# Patient Record
Sex: FEMALE | Race: BLACK OR AFRICAN AMERICAN | Employment: FULL TIME | URBAN - METROPOLITAN AREA
[De-identification: names, ages, dates, MRNs, and addresses within clinical notes are randomized per-mention and may not be internally consistent; named-entity substitution may affect disease eponyms.]

---

## 2017-02-23 ENCOUNTER — HOSPITAL ENCOUNTER (OUTPATIENT)
Dept: SURGERY | Age: 37
Discharge: HOME | End: 2017-02-23
Attending: OBSTETRICS & GYNECOLOGY
Payer: COMMERCIAL

## 2017-02-23 VITALS
BODY MASS INDEX: 34.16 KG/M2 | HEIGHT: 63 IN | DIASTOLIC BLOOD PRESSURE: 110 MMHG | TEMPERATURE: 98.2 F | WEIGHT: 192.8 LBS | HEART RATE: 95 BPM | SYSTOLIC BLOOD PRESSURE: 173 MMHG | OXYGEN SATURATION: 97 %

## 2017-02-23 LAB — HGB BLD-MCNC: 12.5 G/DL (ref 11.7–15.4)

## 2017-02-23 PROCEDURE — 85018 HEMOGLOBIN: CPT | Performed by: ANESTHESIOLOGY

## 2017-02-23 RX ORDER — FLUTICASONE PROPIONATE 50 MCG
1 SPRAY, SUSPENSION (ML) NASAL 2 TIMES DAILY
COMMUNITY

## 2017-02-23 RX ORDER — ASCORBIC ACID 500 MG
TABLET ORAL
COMMUNITY
End: 2018-06-07

## 2017-02-23 NOTE — PERIOP NOTES
Patient verified name, , and surgery as listed in [facility]. Type 2 surgery, walk in assessment complete. Labs per surgeon: unknown; no orders received. Labs per anesthesia protocol: HGB; results pending. T&S ordered for dos. EKG: not needed per [facility] protocols. Dr. Manish Cervantes with anesthesia called due to elevated BP during assessment today. 157/109 & 173/110. Patient anxious and tearful during assessment today due to the recent passing of her sister. Dr. Manish Cervantes did not request to see patient today and no orders received. Stated \" I'm okay with that \". Hibiclens and instructions given per hospital policy. Patient provided with handouts including Guide to Surgery, Pain Management, Hand Hygiene, Blood Transfusion Education, and [place] [facility] Brochure. Patient answered medical/surgical history questions at their best of ability. All prior to admission medications documented in [facility]. Original medication prescription bottles not visualized during patient appointment. Patient instructed to hold all vitamins 7 days prior to surgery and NSAIDS 5 days prior to surgery, patient verbalized understanding. Medications to be held: vitamins. Patient instructed to continue previous medications as prescribed prior to surgery and to take the following medications the day of surgery according to anesthesia guidelines with a small sip of water: flonase, tramadol if needed. Patient taught back and verbalized understanding.

## 2017-02-24 ENCOUNTER — ANESTHESIA EVENT (OUTPATIENT)
Dept: SURGERY | Age: 37
DRG: 743 | End: 2017-02-24
Payer: COMMERCIAL

## 2017-02-24 NOTE — INTERVAL H&P NOTE
H&P Update:  Torie Anguiano was seen and examined. History and physical has been reviewed. The patient has been examined.  There have been no significant clinical changes since the completion of the originally dated History and Physical.    Signed By: Didier Mallory MD     February 24, 2017 9:21 AM

## 2017-02-24 NOTE — H&P
ALISON Foreman is a 43 y.o. female seen today to discuss hysterectomy. She had myomectomy by Dr. Jayashree Becker in the past and has been seeing Dr. Martha Whitman at [facility] trying for pregnancy. She had a recent MRI which showed the uterus to be enlarged to 20 week size with multiple uterine fibroids. Dr. Martha Whitman gave her the options of myomectomy again or proceeding with hysterectomy. She is leaning toward hysterectomy but has not made a final decision yet. She is to meet with Dr. Martha Whitman next week.     Past Medical History, Past Surgical History, Family history, Social History, and Medications were all reviewed with the patient today and updated as necessary.           Current Outpatient Prescriptions   Medication Sig    traMADol (ULTRAM) 50 mg tablet Take 1 Tab by mouth every six (6) hours as needed for Pain. Max Daily Amount: 200 mg.    zolpidem (AMBIEN) 10 mg tablet Take 1 Tab by mouth nightly as needed. Max Daily Amount: 10 mg.    norethindrone-ethinyl estradiol (ORTHO-NOVUM 1/35, 28,) 1-35 mg-mcg per tablet Take 1 Tab by mouth every evening.  (Patient taking differently: Take 2 Tabs by mouth every evening.)      No current facility-administered medications for this visit.       No Known Allergies       Past Medical History   Diagnosis Date    Abnormal Pap smear of cervix      Fibroids               Past Surgical History   Procedure Laterality Date    Hx colposcopy        Hx myomectomy        Hx cyst removal Left         under arm            Family History   Problem Relation Age of Onset    Hypertension Mother      Elevated Lipids Mother      Hypertension Father      Diabetes Father      Heart Disease Father      Elevated Lipids Father      High Cholesterol Father      Breast Cancer Other                Social History    Substance Use Topics     Smoking status: Never Smoker     Smokeless tobacco: Not on file     Alcohol use 0.0 oz/week         Comment: occassionally               History   Sexual Activity    Sexual activity: Yes    Partners: Male    Birth control/ protection: Pill, Condom      Obstetric History    T0  TAB0 SAB0 E0 M0 L0              Health Maintenance  Mammogram:   Colonoscopy:   Bone Density:     ROS:     Review of Systems  General: Not Present- Appetite Loss, Chills, Excessive Crying, Fatigue, Fever, Night Sweats and Tiredness. Female Genitourinary: Not Present- Dysmenorrhea, Dyspareunia, Pelvic Pain, Urinary Complaints, Vaginal Discharge, Vaginal dryness and Vaginal itching/burning. + for menstral irregularities and excessive bleeding                PHYSICAL EXAM:          Visit Vitals    /70    Ht 5' 3\" (1.6 m)    Wt 194 lb (88 kg)    BMI 34.37 kg/m2      Constitutional: Vital signs are normal. She appears well-developed and well-nourished. She is active and cooperative. Neurological: She is alert. Nursing note and vitals reviewed.           Medical problems and test results were reviewed with the patient today.      ASSESSMENT and 210 Gilma Briceño was seen today for advice only.     Diagnoses and all orders for this visit:     Abnormal uterine bleeding     Uterine leiomyoma, unspecified location     Other orders  - traMADol (ULTRAM) 50 mg tablet; Take 1 Tab by mouth every six (6) hours as needed for Pain. Max Daily Amount: 200 mg.  - zolpidem (AMBIEN) 10 mg tablet; Take 1 Tab by mouth nightly as needed. Max Daily Amount: 10 mg.     Discussed [place] with patient in detail. Gave her reading info regarding hysterectomy.  She will call us back if she wishes to proceed with hysterectomy     Follow-up Disposition:  Return if symptoms worsen or fail to improve.        Corrinne Puma, MD            Electronically signed by Corrinne Puma, MD at 17 1106        Office Visit on 2017              Revision History              Detailed Report             Note shared with patient   Note Details   Author Corrinne Puma, MD File Time 17 110   Author Type Physician Status Signed   Last  Suni Witt MD Specialty Obstetrics & Gynecology

## 2017-02-27 ENCOUNTER — ANESTHESIA (OUTPATIENT)
Dept: SURGERY | Age: 37
DRG: 743 | End: 2017-02-27
Payer: COMMERCIAL

## 2017-02-27 ENCOUNTER — HOSPITAL ENCOUNTER (INPATIENT)
Age: 37
LOS: 2 days | Discharge: HOME | DRG: 743 | End: 2017-03-01
Attending: OBSTETRICS & GYNECOLOGY | Admitting: OBSTETRICS & GYNECOLOGY
Payer: COMMERCIAL

## 2017-02-27 DIAGNOSIS — D25.1 INTRAMURAL LEIOMYOMA OF UTERUS: ICD-10-CM

## 2017-02-27 DIAGNOSIS — R03.0 TRANSIENT ELEVATED BLOOD PRESSURE: Primary | ICD-10-CM

## 2017-02-27 PROBLEM — D21.9 FIBROID: Status: ACTIVE | Noted: 2017-02-27

## 2017-02-27 LAB
ABO + RH BLD: NORMAL
BLOOD GROUP ANTIBODIES SERPL: NORMAL
CREAT SERPL-MCNC: 0.69 MG/DL (ref 0.6–1)
HCG UR QL: NEGATIVE
SPECIMEN EXP DATE BLD: NORMAL

## 2017-02-27 PROCEDURE — 86900 BLOOD TYPING SEROLOGIC ABO: CPT | Performed by: ANESTHESIOLOGY

## 2017-02-27 PROCEDURE — 77030011266 HC ELECTRD BLD INSL COVD -A: Performed by: OBSTETRICS & GYNECOLOGY

## 2017-02-27 PROCEDURE — 74011250636 HC RX REV CODE- 250/636: Performed by: ANESTHESIOLOGY

## 2017-02-27 PROCEDURE — 74011250637 HC RX REV CODE- 250/637: Performed by: OBSTETRICS & GYNECOLOGY

## 2017-02-27 PROCEDURE — 74011250636 HC RX REV CODE- 250/636: Performed by: OBSTETRICS & GYNECOLOGY

## 2017-02-27 PROCEDURE — 81025 URINE PREGNANCY TEST: CPT

## 2017-02-27 PROCEDURE — 77030031139 HC SUT VCRL2 J&J -A: Performed by: OBSTETRICS & GYNECOLOGY

## 2017-02-27 PROCEDURE — 76210000006 HC OR PH I REC 0.5 TO 1 HR: Performed by: OBSTETRICS & GYNECOLOGY

## 2017-02-27 PROCEDURE — 77030016154: Performed by: OBSTETRICS & GYNECOLOGY

## 2017-02-27 PROCEDURE — 88307 TISSUE EXAM BY PATHOLOGIST: CPT | Performed by: OBSTETRICS & GYNECOLOGY

## 2017-02-27 PROCEDURE — 77030008477 HC STYL SATN SLP COVD -A: Performed by: NURSE ANESTHETIST, CERTIFIED REGISTERED

## 2017-02-27 PROCEDURE — 77030003029 HC SUT VCRL J&J -B: Performed by: OBSTETRICS & GYNECOLOGY

## 2017-02-27 PROCEDURE — 77030011278 HC ELECTRD LIG IMPT COVD -F: Performed by: OBSTETRICS & GYNECOLOGY

## 2017-02-27 PROCEDURE — 77030019908 HC STETH ESOPH SIMS -A: Performed by: NURSE ANESTHETIST, CERTIFIED REGISTERED

## 2017-02-27 PROCEDURE — 0UT90ZZ RESECTION OF UTERUS, OPEN APPROACH: ICD-10-PCS | Performed by: OBSTETRICS & GYNECOLOGY

## 2017-02-27 PROCEDURE — 76010000171 HC OR TIME 2 TO 2.5 HR INTENSV-TIER 1: Performed by: OBSTETRICS & GYNECOLOGY

## 2017-02-27 PROCEDURE — 77030002933 HC SUT MCRYL J&J -A: Performed by: OBSTETRICS & GYNECOLOGY

## 2017-02-27 PROCEDURE — 82565 ASSAY OF CREATININE: CPT | Performed by: OBSTETRICS & GYNECOLOGY

## 2017-02-27 PROCEDURE — 65270000029 HC RM PRIVATE

## 2017-02-27 PROCEDURE — 0UTC0ZZ RESECTION OF CERVIX, OPEN APPROACH: ICD-10-PCS | Performed by: OBSTETRICS & GYNECOLOGY

## 2017-02-27 PROCEDURE — 0UT70ZZ RESECTION OF BILATERAL FALLOPIAN TUBES, OPEN APPROACH: ICD-10-PCS | Performed by: OBSTETRICS & GYNECOLOGY

## 2017-02-27 PROCEDURE — 77030020782 HC GWN BAIR PAWS FLX 3M -B: Performed by: NURSE ANESTHETIST, CERTIFIED REGISTERED

## 2017-02-27 PROCEDURE — 77030002966 HC SUT PDS J&J -A: Performed by: OBSTETRICS & GYNECOLOGY

## 2017-02-27 PROCEDURE — 77030011640 HC PAD GRND REM COVD -A: Performed by: OBSTETRICS & GYNECOLOGY

## 2017-02-27 PROCEDURE — 77030027744 HC PWDR HEMSTAT ARISTA ABSRB 5GM BARD -D: Performed by: OBSTETRICS & GYNECOLOGY

## 2017-02-27 PROCEDURE — 77030018836 HC SOL IRR NACL ICUM -A: Performed by: OBSTETRICS & GYNECOLOGY

## 2017-02-27 PROCEDURE — 74011000250 HC RX REV CODE- 250

## 2017-02-27 PROCEDURE — 77030034849: Performed by: OBSTETRICS & GYNECOLOGY

## 2017-02-27 PROCEDURE — 77030008703 HC TU ET UNCUF COVD -A: Performed by: NURSE ANESTHETIST, CERTIFIED REGISTERED

## 2017-02-27 PROCEDURE — 77030032490 HC SLV COMPR SCD KNE COVD -B: Performed by: OBSTETRICS & GYNECOLOGY

## 2017-02-27 PROCEDURE — 76060000035 HC ANESTHESIA 2 TO 2.5 HR: Performed by: OBSTETRICS & GYNECOLOGY

## 2017-02-27 PROCEDURE — 74011250637 HC RX REV CODE- 250/637: Performed by: ANESTHESIOLOGY

## 2017-02-27 PROCEDURE — 77030005520 HC CATH URETH FOL38 BARD -A: Performed by: OBSTETRICS & GYNECOLOGY

## 2017-02-27 PROCEDURE — 74011250636 HC RX REV CODE- 250/636

## 2017-02-27 PROCEDURE — 74011000250 HC RX REV CODE- 250: Performed by: ANESTHESIOLOGY

## 2017-02-27 PROCEDURE — 77030027138 HC INCENT SPIROMETER -A

## 2017-02-27 RX ORDER — FENTANYL CITRATE 50 UG/ML
INJECTION, SOLUTION INTRAMUSCULAR; INTRAVENOUS AS NEEDED
Status: DISCONTINUED | OUTPATIENT
Start: 2017-02-27 | End: 2017-02-27 | Stop reason: HOSPADM

## 2017-02-27 RX ORDER — DOCUSATE SODIUM 100 MG/1
100 CAPSULE, LIQUID FILLED ORAL 2 TIMES DAILY
Status: DISCONTINUED | OUTPATIENT
Start: 2017-02-27 | End: 2017-03-01 | Stop reason: HOSPADM

## 2017-02-27 RX ORDER — SODIUM CHLORIDE, SODIUM LACTATE, POTASSIUM CHLORIDE, CALCIUM CHLORIDE 600; 310; 30; 20 MG/100ML; MG/100ML; MG/100ML; MG/100ML
150 INJECTION, SOLUTION INTRAVENOUS CONTINUOUS
Status: DISCONTINUED | OUTPATIENT
Start: 2017-02-27 | End: 2017-02-28

## 2017-02-27 RX ORDER — HYDROMORPHONE HYDROCHLORIDE 2 MG/ML
INJECTION, SOLUTION INTRAMUSCULAR; INTRAVENOUS; SUBCUTANEOUS AS NEEDED
Status: DISCONTINUED | OUTPATIENT
Start: 2017-02-27 | End: 2017-02-27 | Stop reason: HOSPADM

## 2017-02-27 RX ORDER — LABETALOL HYDROCHLORIDE 5 MG/ML
INJECTION, SOLUTION INTRAVENOUS AS NEEDED
Status: DISCONTINUED | OUTPATIENT
Start: 2017-02-27 | End: 2017-02-27 | Stop reason: HOSPADM

## 2017-02-27 RX ORDER — SODIUM CHLORIDE 0.9 % (FLUSH) 0.9 %
5-10 SYRINGE (ML) INJECTION EVERY 8 HOURS
Status: DISCONTINUED | OUTPATIENT
Start: 2017-02-27 | End: 2017-02-27 | Stop reason: HOSPADM

## 2017-02-27 RX ORDER — LIDOCAINE HYDROCHLORIDE 10 MG/ML
0.1 INJECTION INFILTRATION; PERINEURAL AS NEEDED
Status: DISCONTINUED | OUTPATIENT
Start: 2017-02-27 | End: 2017-02-27 | Stop reason: HOSPADM

## 2017-02-27 RX ORDER — ATROPA BELLADONNA AND OPIUM 16.2; 6 MG/1; MG/1
1 SUPPOSITORY RECTAL
Status: DISCONTINUED | OUTPATIENT
Start: 2017-02-27 | End: 2017-03-01 | Stop reason: HOSPADM

## 2017-02-27 RX ORDER — PROPOFOL 10 MG/ML
INJECTION, EMULSION INTRAVENOUS AS NEEDED
Status: DISCONTINUED | OUTPATIENT
Start: 2017-02-27 | End: 2017-02-27 | Stop reason: HOSPADM

## 2017-02-27 RX ORDER — ZOLPIDEM TARTRATE 5 MG/1
5 TABLET ORAL
Status: DISCONTINUED | OUTPATIENT
Start: 2017-02-27 | End: 2017-03-01 | Stop reason: HOSPADM

## 2017-02-27 RX ORDER — FLUTICASONE PROPIONATE 50 MCG
1 SPRAY, SUSPENSION (ML) NASAL 2 TIMES DAILY
Status: DISCONTINUED | OUTPATIENT
Start: 2017-02-27 | End: 2017-03-01 | Stop reason: HOSPADM

## 2017-02-27 RX ORDER — ONDANSETRON 2 MG/ML
INJECTION INTRAMUSCULAR; INTRAVENOUS AS NEEDED
Status: DISCONTINUED | OUTPATIENT
Start: 2017-02-27 | End: 2017-02-27 | Stop reason: HOSPADM

## 2017-02-27 RX ORDER — ACETAMINOPHEN 10 MG/ML
1000 INJECTION, SOLUTION INTRAVENOUS EVERY 6 HOURS
Status: COMPLETED | OUTPATIENT
Start: 2017-02-27 | End: 2017-02-28

## 2017-02-27 RX ORDER — NEOSTIGMINE METHYLSULFATE 1 MG/ML
INJECTION INTRAVENOUS AS NEEDED
Status: DISCONTINUED | OUTPATIENT
Start: 2017-02-27 | End: 2017-02-27 | Stop reason: HOSPADM

## 2017-02-27 RX ORDER — HYDROMORPHONE HYDROCHLORIDE 2 MG/ML
0.5 INJECTION, SOLUTION INTRAMUSCULAR; INTRAVENOUS; SUBCUTANEOUS
Status: DISCONTINUED | OUTPATIENT
Start: 2017-02-27 | End: 2017-02-27 | Stop reason: HOSPADM

## 2017-02-27 RX ORDER — HYDROMORPHONE HYDROCHLORIDE 1 MG/ML
1 INJECTION, SOLUTION INTRAMUSCULAR; INTRAVENOUS; SUBCUTANEOUS
Status: DISCONTINUED | OUTPATIENT
Start: 2017-02-27 | End: 2017-02-28

## 2017-02-27 RX ORDER — KETOROLAC TROMETHAMINE 30 MG/ML
INJECTION, SOLUTION INTRAMUSCULAR; INTRAVENOUS AS NEEDED
Status: DISCONTINUED | OUTPATIENT
Start: 2017-02-27 | End: 2017-02-27 | Stop reason: HOSPADM

## 2017-02-27 RX ORDER — LIDOCAINE HYDROCHLORIDE 20 MG/ML
INJECTION, SOLUTION EPIDURAL; INFILTRATION; INTRACAUDAL; PERINEURAL AS NEEDED
Status: DISCONTINUED | OUTPATIENT
Start: 2017-02-27 | End: 2017-02-27 | Stop reason: HOSPADM

## 2017-02-27 RX ORDER — DEXAMETHASONE SODIUM PHOSPHATE 4 MG/ML
INJECTION, SOLUTION INTRA-ARTICULAR; INTRALESIONAL; INTRAMUSCULAR; INTRAVENOUS; SOFT TISSUE AS NEEDED
Status: DISCONTINUED | OUTPATIENT
Start: 2017-02-27 | End: 2017-02-27 | Stop reason: HOSPADM

## 2017-02-27 RX ORDER — NALOXONE HYDROCHLORIDE 0.4 MG/ML
0.2 INJECTION, SOLUTION INTRAMUSCULAR; INTRAVENOUS; SUBCUTANEOUS AS NEEDED
Status: DISCONTINUED | OUTPATIENT
Start: 2017-02-27 | End: 2017-02-27 | Stop reason: HOSPADM

## 2017-02-27 RX ORDER — NALOXONE HYDROCHLORIDE 0.4 MG/ML
0.1 INJECTION, SOLUTION INTRAMUSCULAR; INTRAVENOUS; SUBCUTANEOUS
Status: DISCONTINUED | OUTPATIENT
Start: 2017-02-27 | End: 2017-03-01 | Stop reason: HOSPADM

## 2017-02-27 RX ORDER — ROCURONIUM BROMIDE 10 MG/ML
INJECTION, SOLUTION INTRAVENOUS AS NEEDED
Status: DISCONTINUED | OUTPATIENT
Start: 2017-02-27 | End: 2017-02-27 | Stop reason: HOSPADM

## 2017-02-27 RX ORDER — OXYCODONE HYDROCHLORIDE 5 MG/1
10 TABLET ORAL
Status: DISCONTINUED | OUTPATIENT
Start: 2017-02-27 | End: 2017-02-28

## 2017-02-27 RX ORDER — FAMOTIDINE 20 MG/1
20 TABLET, FILM COATED ORAL ONCE
Status: COMPLETED | OUTPATIENT
Start: 2017-02-27 | End: 2017-02-27

## 2017-02-27 RX ORDER — CEFAZOLIN SODIUM IN 0.9 % NACL 2 G/50 ML
2 INTRAVENOUS SOLUTION, PIGGYBACK (ML) INTRAVENOUS ONCE
Status: DISCONTINUED | OUTPATIENT
Start: 2017-02-27 | End: 2017-02-27 | Stop reason: HOSPADM

## 2017-02-27 RX ORDER — SODIUM CHLORIDE 0.9 % (FLUSH) 0.9 %
5-10 SYRINGE (ML) INJECTION EVERY 8 HOURS
Status: DISCONTINUED | OUTPATIENT
Start: 2017-02-27 | End: 2017-02-28

## 2017-02-27 RX ORDER — KETOROLAC TROMETHAMINE 30 MG/ML
30 INJECTION, SOLUTION INTRAMUSCULAR; INTRAVENOUS EVERY 6 HOURS
Status: DISCONTINUED | OUTPATIENT
Start: 2017-02-27 | End: 2017-02-28

## 2017-02-27 RX ORDER — MIDAZOLAM HYDROCHLORIDE 1 MG/ML
2 INJECTION, SOLUTION INTRAMUSCULAR; INTRAVENOUS
Status: DISCONTINUED | OUTPATIENT
Start: 2017-02-27 | End: 2017-02-27 | Stop reason: HOSPADM

## 2017-02-27 RX ORDER — SODIUM CHLORIDE 0.9 % (FLUSH) 0.9 %
5-10 SYRINGE (ML) INJECTION AS NEEDED
Status: DISCONTINUED | OUTPATIENT
Start: 2017-02-27 | End: 2017-02-27 | Stop reason: HOSPADM

## 2017-02-27 RX ORDER — ATROPA BELLADONNA AND OPIUM 16.2; 6 MG/1; MG/1
SUPPOSITORY RECTAL AS NEEDED
Status: DISCONTINUED | OUTPATIENT
Start: 2017-02-27 | End: 2017-02-27 | Stop reason: HOSPADM

## 2017-02-27 RX ORDER — LORAZEPAM 1 MG/1
0.5 TABLET ORAL
Status: DISCONTINUED | OUTPATIENT
Start: 2017-02-27 | End: 2017-03-01 | Stop reason: HOSPADM

## 2017-02-27 RX ORDER — OXYCODONE AND ACETAMINOPHEN 5; 325 MG/1; MG/1
1 TABLET ORAL AS NEEDED
Status: DISCONTINUED | OUTPATIENT
Start: 2017-02-27 | End: 2017-02-27 | Stop reason: HOSPADM

## 2017-02-27 RX ORDER — DIPHENHYDRAMINE HYDROCHLORIDE 50 MG/ML
12.5 INJECTION, SOLUTION INTRAMUSCULAR; INTRAVENOUS
Status: DISCONTINUED | OUTPATIENT
Start: 2017-02-27 | End: 2017-03-01 | Stop reason: HOSPADM

## 2017-02-27 RX ORDER — SODIUM CHLORIDE 0.9 % (FLUSH) 0.9 %
5-10 SYRINGE (ML) INJECTION AS NEEDED
Status: DISCONTINUED | OUTPATIENT
Start: 2017-02-27 | End: 2017-03-01 | Stop reason: HOSPADM

## 2017-02-27 RX ORDER — SODIUM CHLORIDE, SODIUM LACTATE, POTASSIUM CHLORIDE, CALCIUM CHLORIDE 600; 310; 30; 20 MG/100ML; MG/100ML; MG/100ML; MG/100ML
75 INJECTION, SOLUTION INTRAVENOUS CONTINUOUS
Status: DISCONTINUED | OUTPATIENT
Start: 2017-02-27 | End: 2017-02-27 | Stop reason: HOSPADM

## 2017-02-27 RX ORDER — GLYCOPYRROLATE 0.2 MG/ML
INJECTION INTRAMUSCULAR; INTRAVENOUS AS NEEDED
Status: DISCONTINUED | OUTPATIENT
Start: 2017-02-27 | End: 2017-02-27 | Stop reason: HOSPADM

## 2017-02-27 RX ORDER — OXYCODONE HYDROCHLORIDE 5 MG/1
5 TABLET ORAL
Status: DISCONTINUED | OUTPATIENT
Start: 2017-02-27 | End: 2017-02-28

## 2017-02-27 RX ORDER — ONDANSETRON 2 MG/ML
4 INJECTION INTRAMUSCULAR; INTRAVENOUS
Status: DISCONTINUED | OUTPATIENT
Start: 2017-02-27 | End: 2017-03-01 | Stop reason: HOSPADM

## 2017-02-27 RX ADMIN — LABETALOL HYDROCHLORIDE 10 MG: 5 INJECTION, SOLUTION INTRAVENOUS at 08:36

## 2017-02-27 RX ADMIN — LIDOCAINE HYDROCHLORIDE 100 MG: 20 INJECTION, SOLUTION EPIDURAL; INFILTRATION; INTRACAUDAL; PERINEURAL at 08:20

## 2017-02-27 RX ADMIN — HYDROMORPHONE HYDROCHLORIDE 1 MG: 1 INJECTION, SOLUTION INTRAMUSCULAR; INTRAVENOUS; SUBCUTANEOUS at 12:50

## 2017-02-27 RX ADMIN — FENTANYL CITRATE 50 MCG: 50 INJECTION, SOLUTION INTRAMUSCULAR; INTRAVENOUS at 09:39

## 2017-02-27 RX ADMIN — HYDROMORPHONE HYDROCHLORIDE 0.5 MG: 2 INJECTION, SOLUTION INTRAMUSCULAR; INTRAVENOUS; SUBCUTANEOUS at 10:39

## 2017-02-27 RX ADMIN — SODIUM CHLORIDE, SODIUM LACTATE, POTASSIUM CHLORIDE, AND CALCIUM CHLORIDE 150 ML/HR: 600; 310; 30; 20 INJECTION, SOLUTION INTRAVENOUS at 12:52

## 2017-02-27 RX ADMIN — LABETALOL HYDROCHLORIDE 5 MG: 5 INJECTION, SOLUTION INTRAVENOUS at 08:53

## 2017-02-27 RX ADMIN — PROPOFOL 200 MG: 10 INJECTION, EMULSION INTRAVENOUS at 08:20

## 2017-02-27 RX ADMIN — KETOROLAC TROMETHAMINE 30 MG: 30 INJECTION, SOLUTION INTRAMUSCULAR; INTRAVENOUS at 09:30

## 2017-02-27 RX ADMIN — ROCURONIUM BROMIDE 10 MG: 10 INJECTION, SOLUTION INTRAVENOUS at 09:16

## 2017-02-27 RX ADMIN — ACETAMINOPHEN 1000 MG: 10 INJECTION, SOLUTION INTRAVENOUS at 22:10

## 2017-02-27 RX ADMIN — FENTANYL CITRATE 50 MCG: 50 INJECTION, SOLUTION INTRAMUSCULAR; INTRAVENOUS at 09:54

## 2017-02-27 RX ADMIN — ACETAMINOPHEN 1000 MG: 10 INJECTION, SOLUTION INTRAVENOUS at 10:15

## 2017-02-27 RX ADMIN — HYDROMORPHONE HYDROCHLORIDE 0.5 MG: 2 INJECTION, SOLUTION INTRAMUSCULAR; INTRAVENOUS; SUBCUTANEOUS at 10:15

## 2017-02-27 RX ADMIN — ACETAMINOPHEN 1000 MG: 10 INJECTION, SOLUTION INTRAVENOUS at 16:09

## 2017-02-27 RX ADMIN — OXYCODONE HYDROCHLORIDE 10 MG: 5 TABLET ORAL at 20:29

## 2017-02-27 RX ADMIN — HYDROMORPHONE HYDROCHLORIDE 1 MG: 1 INJECTION, SOLUTION INTRAMUSCULAR; INTRAVENOUS; SUBCUTANEOUS at 18:00

## 2017-02-27 RX ADMIN — NEOSTIGMINE METHYLSULFATE 3 MG: 1 INJECTION INTRAVENOUS at 10:07

## 2017-02-27 RX ADMIN — FENTANYL CITRATE 50 MCG: 50 INJECTION, SOLUTION INTRAMUSCULAR; INTRAVENOUS at 08:41

## 2017-02-27 RX ADMIN — SODIUM CHLORIDE, SODIUM LACTATE, POTASSIUM CHLORIDE, AND CALCIUM CHLORIDE: 600; 310; 30; 20 INJECTION, SOLUTION INTRAVENOUS at 09:13

## 2017-02-27 RX ADMIN — FENTANYL CITRATE 50 MCG: 50 INJECTION, SOLUTION INTRAMUSCULAR; INTRAVENOUS at 08:44

## 2017-02-27 RX ADMIN — SODIUM CHLORIDE, SODIUM LACTATE, POTASSIUM CHLORIDE, AND CALCIUM CHLORIDE 150 ML/HR: 600; 310; 30; 20 INJECTION, SOLUTION INTRAVENOUS at 18:49

## 2017-02-27 RX ADMIN — HYDROMORPHONE HYDROCHLORIDE 0.5 MG: 2 INJECTION, SOLUTION INTRAMUSCULAR; INTRAVENOUS; SUBCUTANEOUS at 10:34

## 2017-02-27 RX ADMIN — SODIUM CHLORIDE, SODIUM LACTATE, POTASSIUM CHLORIDE, AND CALCIUM CHLORIDE: 600; 310; 30; 20 INJECTION, SOLUTION INTRAVENOUS at 08:20

## 2017-02-27 RX ADMIN — GLYCOPYRROLATE 0.4 MG: 0.2 INJECTION INTRAMUSCULAR; INTRAVENOUS at 10:07

## 2017-02-27 RX ADMIN — SODIUM CHLORIDE, SODIUM LACTATE, POTASSIUM CHLORIDE, AND CALCIUM CHLORIDE 75 ML/HR: 600; 310; 30; 20 INJECTION, SOLUTION INTRAVENOUS at 07:16

## 2017-02-27 RX ADMIN — LORAZEPAM 0.5 MG: 1 TABLET ORAL at 21:07

## 2017-02-27 RX ADMIN — DOCUSATE SODIUM 100 MG: 100 CAPSULE, LIQUID FILLED ORAL at 18:00

## 2017-02-27 RX ADMIN — FAMOTIDINE 20 MG: 20 TABLET ORAL at 07:08

## 2017-02-27 RX ADMIN — HYDROMORPHONE HYDROCHLORIDE 0.5 MG: 2 INJECTION, SOLUTION INTRAMUSCULAR; INTRAVENOUS; SUBCUTANEOUS at 09:52

## 2017-02-27 RX ADMIN — HYDROMORPHONE HYDROCHLORIDE 1 MG: 1 INJECTION, SOLUTION INTRAMUSCULAR; INTRAVENOUS; SUBCUTANEOUS at 22:18

## 2017-02-27 RX ADMIN — HYDROMORPHONE HYDROCHLORIDE 0.5 MG: 2 INJECTION, SOLUTION INTRAMUSCULAR; INTRAVENOUS; SUBCUTANEOUS at 11:03

## 2017-02-27 RX ADMIN — HYDROMORPHONE HYDROCHLORIDE 0.5 MG: 2 INJECTION, SOLUTION INTRAMUSCULAR; INTRAVENOUS; SUBCUTANEOUS at 10:13

## 2017-02-27 RX ADMIN — OXYCODONE HYDROCHLORIDE 10 MG: 5 TABLET ORAL at 15:08

## 2017-02-27 RX ADMIN — KETOROLAC TROMETHAMINE 30 MG: 30 INJECTION, SOLUTION INTRAMUSCULAR at 16:02

## 2017-02-27 RX ADMIN — MIDAZOLAM HYDROCHLORIDE 2 MG: 1 INJECTION, SOLUTION INTRAMUSCULAR; INTRAVENOUS at 07:26

## 2017-02-27 RX ADMIN — ROCURONIUM BROMIDE 50 MG: 10 INJECTION, SOLUTION INTRAVENOUS at 08:20

## 2017-02-27 RX ADMIN — HYDROMORPHONE HYDROCHLORIDE 0.5 MG: 2 INJECTION, SOLUTION INTRAMUSCULAR; INTRAVENOUS; SUBCUTANEOUS at 09:30

## 2017-02-27 RX ADMIN — LIDOCAINE HYDROCHLORIDE 0.1 ML: 10 INJECTION, SOLUTION INFILTRATION; PERINEURAL at 07:16

## 2017-02-27 RX ADMIN — HYDROMORPHONE HYDROCHLORIDE 0.5 MG: 2 INJECTION, SOLUTION INTRAMUSCULAR; INTRAVENOUS; SUBCUTANEOUS at 10:58

## 2017-02-27 RX ADMIN — DEXAMETHASONE SODIUM PHOSPHATE 10 MG: 4 INJECTION, SOLUTION INTRA-ARTICULAR; INTRALESIONAL; INTRAMUSCULAR; INTRAVENOUS; SOFT TISSUE at 08:36

## 2017-02-27 RX ADMIN — ONDANSETRON 4 MG: 2 INJECTION INTRAMUSCULAR; INTRAVENOUS at 08:36

## 2017-02-27 RX ADMIN — FENTANYL CITRATE 100 MCG: 50 INJECTION, SOLUTION INTRAMUSCULAR; INTRAVENOUS at 08:20

## 2017-02-27 NOTE — BRIEF OP NOTE
BRIEF OPERATIVE NOTE    Date of Procedure: 2/27/2017   Preoperative Diagnosis: Submucous leiomyoma of uterus [D25.0]  Postoperative Diagnosis: Submucous leiomyoma of uterus [D25.0]    Procedure(s):   HYSTERECTOMY ABDOMINAL TOTAL ([place]) / BILATERAL SALPINGECTOMY  Surgeon(s) and Role:     * Pastora Brar MD - Primary            Surgical Staff:  Circ-1: Roxy Rob RN  Circ-Relief: Maikol Caballero RN  Scrub Tech-1: Rebecca Borden  Scrub Tech-2: Sangeetha Varghese  Scrub Tech-3: Lalita Raya  Event Time In   Incision Start 0840   Incision Close 1016     Anesthesia: General   Estimated Blood Loss: 300  Specimens:   ID Type Source Tests Collected by Time Destination   1 : UTERUS, BILATERAL FALLOPIAN TUBES, CERVIX, AND FIBROID Fresh   Pastora Brar MD 2/27/2017 0935 Pathology      Findings: 18 wk size uterus w/ adhesions,normal ovaries   Complications: 0  Implants: * No implants in log *

## 2017-02-27 NOTE — PROGRESS NOTES
Phoned Dr. Manda Gonzalez in regards to patient pain control. Per Dr. Manda Gonzalez, patient may have Dilaudid 1mg IV at this time and to increase Oxy 5mg PO to 10mg PO every 4 hours as needed for pain. Telephone order with read back.

## 2017-02-27 NOTE — IP AVS SNAPSHOT
[id number] [facility] [address] 
[phone] Patient: Angella Jama MRN: [id number] :1980 You are allergic to the following Allergen Reactions Latex Itching Hydrocodone Nausea Only Recent Documentation Height 1.6 m Emergency Contacts Name Discharge Info Relation Home Work Mobile Valente Samuel DISCHARGE CAREGIVER [3] Spouse [3] [phone] About your hospitalization You were admitted on:  2017 You last received care in the:  [address] [facility] You were discharged on:  2017 Unit phone number:  [phone] Why you were hospitalized Your primary diagnosis was:  Fibroid Your diagnoses also included:  Essential Hypertension Providers Seen During Your Hospitalizations Provider Role Specialty Primary office phone Rikki Villalobos MD Attending Provider Obstetrics & Gynecology [phone] Your Primary Care Physician (PCP) Primary Care Physician Office Phone Office Fax Meliza Ernie [phone] Follow-up Information Follow up With Details Comments Contact Info Nestor Andrea MD   [facility] [address] [place] [address] [phone] Rikki Villalobos MD In 6 weeks As needed and/or, If symptoms worsen; 1 week with PCP for  [facility] 
[address] [place] [address] 
[phone] Your Appointments 2017 10:15 AM EDT  
POST OP with Rikki Villalobos MD  
[facility] WOMENS [facility] ([address] [place] [address]) [facility] [id number] [phone] Current Discharge Medication List  
  
START taking these medications Dose & Instructions Dispensing Information Comments Morning Noon Evening Bedtime amLODIPine 5 mg tablet Commonly known as:  Wandra Catherine Your next dose is: Today, Tomorrow Other:  _________ Dose:  5 mg Take 1 Tab by mouth daily. Quantity:  30 Tab Refills:  5 HYDROmorphone 2 mg tablet Commonly known as:  DILAUDID Your next dose is: Today, Tomorrow Other:  _________ Dose:  1 mg Take 0.5 Tabs by mouth every six (6) hours as needed for Pain. Max Daily Amount: 4 mg. Quantity:  24 Tab Refills:  0  
     
   
   
   
  
 ketorolac 10 mg tablet Commonly known as:  TORADOL Your next dose is: Today, Tomorrow Other:  _________ Dose:  10 mg Take 1 Tab by mouth every six (6) hours. Quantity:  20 Tab Refills:  0 CONTINUE these medications which have NOT CHANGED Dose & Instructions Dispensing Information Comments Morning Noon Evening Bedtime  
 ascorbic acid (vitamin C) 500 mg tablet Commonly known as:  VITAMIN C Your next dose is: Today, Tomorrow Other:  _________ Take  by mouth. Refills:  0  
     
   
   
   
  
 FLONASE 50 mcg/actuation nasal spray Generic drug:  fluticasone Your next dose is: Today, Tomorrow Other:  _________ Dose:  1 Spray 1 [place] by Both Nostrils route two (2) times a day. Take / use AM day of surgery  per anesthesia protocols. Refills:  0 IRON (DRIED) PO Your next dose is: Today, Tomorrow Other:  _________ Dose:  3 Tab Take 3 Tabs by mouth daily. Refills:  0 MOOD SUPPORT FORMULA PO Your next dose is: Today, Tomorrow Other:  _________ Take  by mouth daily as needed. Refills:  0  
     
   
   
   
  
 traMADol 50 mg tablet Commonly known as:  ULTRAM  
   
Your next dose is: Today, Tomorrow Other:  _________  Dose:  50 mg  
 Take 1 Tab by mouth every six (6) hours as needed for Pain. Max Daily Amount: 200 mg. Quantity:  30 Tab Refills:  1  
     
   
   
   
  
 zolpidem 10 mg tablet Commonly known as:  AMBIEN Your next dose is: Today, Tomorrow Other:  _________ Dose:  10 mg Take 1 Tab by mouth nightly as needed. Max Daily Amount: 10 mg.  
 Quantity:  30 Tab Refills:  5 STOP taking these medications   
 norethindrone-ethinyl estradiol 1-35 mg-mcg Tab Commonly known as:  ORTHO-NOVUM 1/35 (28) Where to Get Your Medications Information on where to get these meds will be given to you by the nurse or doctor. ! Ask your nurse or doctor about these medications  
  amLODIPine 5 mg tablet HYDROmorphone 2 mg tablet  
 ketorolac 10 mg tablet Discharge Instructions High Blood Pressure: Care Instructions Your Care Instructions If your blood pressure is usually above 140/90, you have high blood pressure, or hypertension. That means the top number is 140 or higher or the bottom number is 90 or higher, or both. Despite what a lot of people think, high blood pressure usually doesn't cause headaches or make you feel dizzy or lightheaded. It usually has no symptoms. But it does increase your risk for heart attack, stroke, and kidney or eye damage. The higher your blood pressure, the more your risk increases. Your doctor will give you a goal for your blood pressure. Your goal will be based on your health and your age. An example of a goal is to keep your blood pressure below 140/90. Lifestyle changes, such as eating healthy and being active, are always important to help lower blood pressure. You might also take medicine to reach your blood pressure goal. 
Follow-up care is a key part of your treatment and safety.  Be sure to make and go to all appointments, and call your doctor if you are having problems. It's also a good idea to know your test results and keep a list of the medicines you take. How can you care for yourself at home? Medical treatment · If you stop taking your medicine, your blood pressure will go back up. You may take one or more types of medicine to lower your blood pressure. Be safe with medicines. Take your medicine exactly as prescribed. Call your doctor if you think you are having a problem with your medicine. · Talk to your doctor before you start taking aspirin every day. Aspirin can help certain people lower their risk of a heart attack or stroke. But taking aspirin isn't right for everyone, because it can cause serious bleeding. · See your doctor regularly. You may need to see the doctor more often at first or until your blood pressure comes down. · If you are taking blood pressure medicine, talk to your doctor before you take decongestants or anti-inflammatory medicine, such as ibuprofen. Some of these medicines can raise blood pressure. · Learn how to check your blood pressure at home. Lifestyle changes · Stay at a healthy weight. This is especially important if you put on weight around the waist. Losing even 10 pounds can help you lower your blood pressure. · If your doctor recommends it, get more exercise. Walking is a good choice. Bit by bit, increase the amount you walk every day. Try for at least 30 minutes on most days of the week. You also may want to swim, bike, or do other activities. · Avoid or limit alcohol. Talk to your doctor about whether you can drink any alcohol. · Try to limit how much sodium you eat to less than 2,300 milligrams (mg) a day. Your doctor may ask you to try to eat less than 1,500 mg a day. · Eat plenty of fruits (such as bananas and oranges), vegetables, legumes, whole grains, and low-fat dairy products. · Lower the amount of saturated fat in your diet.  Saturated fat is found in animal products such as milk, cheese, and meat. Limiting these foods may help you lose weight and also lower your risk for heart disease. · Do not smoke. Smoking increases your risk for heart attack and stroke. If you need help quitting, talk to your doctor about stop-smoking programs and medicines. These can increase your chances of quitting for good. When should you call for help? Call 911 anytime you think you may need emergency care. This may mean having symptoms that suggest that your blood pressure is causing a serious heart or blood vessel problem. Your blood pressure may be over 180/110. For example, call 911 if: 
· You have symptoms of a heart attack. These may include: ¨ Chest pain or pressure, or a strange feeling in the chest. 
¨ Sweating. ¨ Shortness of breath. ¨ Nausea or vomiting. ¨ Pain, pressure, or a strange feeling in the back, neck, jaw, or upper belly or in one or both shoulders or arms. ¨ Lightheadedness or sudden weakness. ¨ A fast or irregular heartbeat. · You have symptoms of a stroke. These may include: 
¨ Sudden numbness, tingling, weakness, or loss of movement in your face, arm, or leg, especially on only one side of your body. ¨ Sudden vision changes. ¨ Sudden trouble speaking. ¨ Sudden confusion or trouble understanding simple statements. ¨ Sudden problems with walking or balance. ¨ A sudden, severe headache that is different from past headaches. · You have severe back or belly pain. Do not wait until your blood pressure comes down on its own. Get help right away. Call your doctor now or seek immediate care if: 
· Your blood pressure is much higher than normal (such as 180/110 or higher), but you don't have symptoms. · You think high blood pressure is causing symptoms, such as: ¨ Severe headache. ¨ Blurry vision. Watch closely for changes in your health, and be sure to contact your doctor if: · Your blood pressure measures 140/90 or higher at least 2 times. That means the top number is 140 or higher or the bottom number is 90 or higher, or both. · You think you may be having side effects from your blood pressure medicine. · Your blood pressure is usually normal, but it goes above normal at least 2 times. Where can you learn more? Go to http://nydia-florian.info/. Enter L411 in the search box to learn more about \"High Blood Pressure: Care Instructions. \" Current as of: August 8, 2016 Content Version: 11.1 © 2750-7620 [employer]. Care instructions adapted under license by [employer] (which disclaims liability or warranty for this information). If you have questions about a medical condition or this instruction, always ask your healthcare professional. [address] any warranty or liability for your use of this information. Abdominal Hysterectomy: What to Expect at [facility] Your Recovery You can expect to feel better and stronger each day, although you may need pain medicine for a week or two. You may get tired easily or have less energy than usual. This may last for several weeks after surgery. You will probably notice that your belly is swollen and puffy. This is common. The swelling will take several weeks to go down. It may take about 4 to 6 weeks to fully recover. It is important to avoid lifting while you are recovering so that you can heal. 
This care sheet gives you a general idea about how long it will take for you to recover. But each person recovers at a different pace. Follow the steps below to get better as quickly as possible. How can you care for yourself at home? Activity · Rest when you feel tired. Getting enough sleep will help you recover. · Try to walk each day. Start by walking a little more than you did the day before. Bit by bit, increase the amount you walk.  Walking boosts blood flow and helps prevent pneumonia and constipation. · Avoid lifting anything that would make you strain. This may include a child, heavy grocery bags and milk containers, a heavy briefcase or backpack, cat litter or dog food bags, or a vacuum . · Avoid strenuous activities, such as biking, jogging, weight lifting, or aerobic exercise, until your doctor says it is okay. · You may shower. Pat the cut (incision) dry. Do not take a bath for the first 2 weeks, or until your doctor tells you it is okay. · Ask your doctor when you can drive again. · You will probably need to take 2 to 4 weeks off from work. It depends on the type of work you do and how you feel. · Your doctor will tell you when you can have sex again. Diet · You can eat your normal diet. If your stomach is upset, try bland, low-fat foods like plain rice, broiled chicken, toast, and yogurt. · Drink plenty of fluids (unless your doctor tells you not to). · You may notice that your bowel movements are not regular right after your surgery. This is common. Try to avoid constipation and straining with bowel movements. You may want to take a fiber supplement every day. If you have not had a bowel movement after a couple of days, ask your doctor about taking a mild laxative. Medicines · Your doctor will tell you if and when you can restart your medicines. He or she will also give you instructions about taking any new medicines. · If you take blood thinners, such as warfarin (Coumadin), clopidogrel (Plavix), or aspirin, be sure to talk to your doctor. He or she will tell you if and when to start taking those medicines again. Make sure that you understand exactly what your doctor wants you to do. · Be safe with medicines. Take pain medicines exactly as directed. ¨ If the doctor gave you a prescription medicine for pain, take it as prescribed.  
¨ If you are not taking a prescription pain medicine, ask your doctor if you can take an over-the-counter medicine. · If your doctor prescribed antibiotics, take them as directed. Do not stop taking them just because you feel better. You need to take the full course of antibiotics. · If you think your pain medicine is making you sick to your stomach: 
¨ Take your medicine after meals (unless your doctor has told you not to). ¨ Ask your doctor for a different pain medicine. Incision care · If you have strips of tape on the cut (incision) the doctor made, leave the tape on for a week or until it falls off. Or follow your doctor's instructions for removing the tape. · Wash the area daily with warm, soapy water, and pat it dry. Don't use hydrogen peroxide or alcohol, which can slow healing. You may cover the area with a gauze bandage if it weeps or rubs against clothing. Change the bandage every day. · Keep the area clean and dry. Other instructions · You may have some light vaginal bleeding. Wear sanitary pads if needed. Do not douche or use tampons. Follow-up care is a key part of your treatment and safety. Be sure to make and go to all appointments, and call your doctor if you are having problems. It's also a good idea to know your test results and keep a list of the medicines you take. When should you call for help? Call 911 anytime you think you may need emergency care. For example, call if: 
· You passed out (lost consciousness). · You have sudden chest pain and shortness of breath, or you cough up blood. · You have severe pain in your belly. Call your doctor now or seek immediate medical care if: 
· You have bright red vaginal bleeding that soaks one or more pads in an hour, or you have large clots. · You have foul-smelling discharge from your vagina. · You are sick to your stomach or cannot keep fluids down. · You have signs of infection, such as: 
¨ Increased pain, swelling, warmth, or redness. ¨ Red streaks leading from the incision. ¨ Pus draining from the incision. ¨ A fever. · You have pain that does not get better after you take pain medicine. · You have loose stitches, or your incision comes open. · You have signs of a blood clot, such as: 
¨ Pain in your calf, back of knee, thigh, or groin. ¨ Redness and swelling in your leg or groin. · You have trouble passing urine or stool, especially if you have pain or swelling in your lower belly. · You have hot flashes, sweating, flushing, or a fast or pounding heartbeat. Watch closely for changes in your health, and be sure to contact your doctor if: 
· You do not have a bowel movement after taking a laxative. Where can you learn more? Go to http://nydia-florian.info/. Enter M280 in the search box to learn more about \"Abdominal Hysterectomy: What to Expect at Home. \" Current as of: February 25, 2016 Content Version: 11.1 © 0836-8718 [employer]. Care instructions adapted under license by [facility] (which disclaims liability or warranty for this information). If you have questions about a medical condition or this instruction, always ask your healthcare professional. [address] [address] any warranty or liability for your use of this information. Discharge Orders None [employer] Announcement We are excited to announce that we are making your provider's discharge notes available to you in [employer]. You will see these notes when they are completed and signed by the physician that discharged you from your recent hospital stay. If you have any questions or concerns about any information you see in [employer], please call the Health Information Department where you were seen or reach out to your Primary Care Provider for more information about your plan of care. General Information Please provide this summary of care documentation to your next provider.  
  
  
    
    
 Patient Signature: ____________________________________________________________ Date:  ____________________________________________________________  
  
Joelene Batman Provider Signature:  ____________________________________________________________ Date:  ____________________________________________________________

## 2017-02-27 NOTE — PROGRESS NOTES
Patient admission assessment completed as noted. Incentive spirometer given to patient, educated patient on use. Patient verbalized and demonstrated understanding.

## 2017-02-27 NOTE — ANESTHESIA PREPROCEDURE EVALUATION
Anesthetic History   No history of anesthetic complications            Review of Systems / Medical History  Patient summary reviewed, nursing notes reviewed and pertinent labs reviewed    Pulmonary  Within defined limits                 Neuro/Psych              Cardiovascular                  Exercise tolerance: >4 METS     GI/Hepatic/Renal     GERD: well controlled           Endo/Other        Obesity     Other Findings              Physical Exam    Airway  Mallampati: II  TM Distance: 4 - 6 cm  Neck ROM: normal range of motion   Mouth opening: Normal     Cardiovascular    Rhythm: regular           Dental  No notable dental hx       Pulmonary  Breath sounds clear to auscultation               Abdominal         Other Findings            Anesthetic Plan    ASA: 2  Anesthesia type: general          Induction: Intravenous  Anesthetic plan and risks discussed with: Patient

## 2017-02-27 NOTE — PROGRESS NOTES
TRANSFER - IN REPORT:    Verbal report received from Rosanne Escalante RN on [employer]  being received from PACU for routine progression of care      Report consisted of patients Situation, Background, Assessment and   Recommendations(SBAR). Information from the following report(s) SBAR, Procedure Summary, Intake/Output, MAR and Recent Results was reviewed with the receiving nurse. Opportunity for questions and clarification was provided. Assessment completed upon patients arrival to unit and care assumed.

## 2017-02-27 NOTE — ANESTHESIA POSTPROCEDURE EVALUATION
Post-Anesthesia Evaluation and Assessment    Patient: Rolo Guevara MRN: [id number]  SSN: [id number]    YOB: 1980  Age: 42 y.o. Sex: female       Cardiovascular Function/Vital Signs  Visit Vitals    /87    Pulse 92    Temp 36.7 °C (98 °F)    Resp 16    Ht 5' 3\" (1.6 m)    Wt 87.1 kg (192 lb)    SpO2 97%    BMI 34.01 kg/m2       Patient is status post general anesthesia for Procedure(s): HYSTERECTOMY ABDOMINAL TOTAL ([place]) / BILATERAL SALPINGECTOMY. Nausea/Vomiting: None    Postoperative hydration reviewed and adequate. Pain:  Pain Scale 1: Numeric (0 - 10) (02/27/17 1058)  Pain Intensity 1: 10 (02/27/17 1058)   Managed    Neurological Status:   Neuro (WDL): Exceptions to WDL (02/27/17 1026)  Neuro  Neurologic State: Eyes open spontaneously (02/27/17 1055)  Cognition: Follows commands (02/27/17 1026)  LUE Motor Response: Purposeful (02/27/17 1026)  LLE Motor Response: Purposeful (02/27/17 1026)  RUE Motor Response: Purposeful (02/27/17 1026)  RLE Motor Response: Purposeful (02/27/17 1026)   At baseline    Mental Status and Level of Consciousness: Arousable    Pulmonary Status:   O2 Device: Nasal cannula (02/27/17 1041)   Adequate oxygenation and airway patent    Complications related to anesthesia: None    Post-anesthesia assessment completed.  No concerns    Signed By: Maryjane Segal MD     February 27, 2017

## 2017-02-27 NOTE — PERIOP NOTES
TRANSFER - OUT REPORT:    Verbal report given to [facility] RN(name) on Bautista  being transferred to Room 452(unit) for routine post - op       Report consisted of patients Situation, Background, Assessment and   Recommendations(SBAR). Information from the following report(s) SBAR, Kardex, OR Summary, Procedure Summary, Intake/Output and MAR was reviewed with the receiving nurse. Opportunity for questions and clarification was provided.       Patient transported with:   O2 @ 2 liters  Tech

## 2017-02-28 PROBLEM — I10 ESSENTIAL HYPERTENSION: Status: ACTIVE | Noted: 2017-02-28

## 2017-02-28 PROCEDURE — 74011250637 HC RX REV CODE- 250/637: Performed by: INTERNAL MEDICINE

## 2017-02-28 PROCEDURE — 74011250637 HC RX REV CODE- 250/637: Performed by: OBSTETRICS & GYNECOLOGY

## 2017-02-28 PROCEDURE — 65270000029 HC RM PRIVATE

## 2017-02-28 PROCEDURE — 74011250636 HC RX REV CODE- 250/636: Performed by: OBSTETRICS & GYNECOLOGY

## 2017-02-28 RX ORDER — ADHESIVE BANDAGE
30 BANDAGE TOPICAL DAILY PRN
Status: DISCONTINUED | OUTPATIENT
Start: 2017-02-28 | End: 2017-03-01 | Stop reason: HOSPADM

## 2017-02-28 RX ORDER — KETOROLAC TROMETHAMINE 10 MG/1
10 TABLET, FILM COATED ORAL
Status: DISCONTINUED | OUTPATIENT
Start: 2017-02-28 | End: 2017-03-01 | Stop reason: HOSPADM

## 2017-02-28 RX ORDER — AMLODIPINE BESYLATE 5 MG/1
5 TABLET ORAL DAILY
Status: DISCONTINUED | OUTPATIENT
Start: 2017-02-28 | End: 2017-03-01 | Stop reason: HOSPADM

## 2017-02-28 RX ORDER — FACIAL-BODY WIPES
10 EACH TOPICAL DAILY PRN
Status: DISCONTINUED | OUTPATIENT
Start: 2017-02-28 | End: 2017-03-01 | Stop reason: HOSPADM

## 2017-02-28 RX ORDER — HYDROMORPHONE HYDROCHLORIDE 2 MG/1
1 TABLET ORAL
Status: DISCONTINUED | OUTPATIENT
Start: 2017-02-28 | End: 2017-03-01 | Stop reason: HOSPADM

## 2017-02-28 RX ORDER — HYDROMORPHONE HYDROCHLORIDE 2 MG/1
1 TABLET ORAL
Status: DISCONTINUED | OUTPATIENT
Start: 2017-02-28 | End: 2017-02-28

## 2017-02-28 RX ORDER — SIMETHICONE 80 MG
80 TABLET,CHEWABLE ORAL
Status: DISCONTINUED | OUTPATIENT
Start: 2017-02-28 | End: 2017-03-01 | Stop reason: HOSPADM

## 2017-02-28 RX ADMIN — SIMETHICONE CHEW TAB 80 MG 80 MG: 80 TABLET ORAL at 14:24

## 2017-02-28 RX ADMIN — BISACODYL 10 MG: 10 SUPPOSITORY RECTAL at 19:44

## 2017-02-28 RX ADMIN — ACETAMINOPHEN 1000 MG: 10 INJECTION, SOLUTION INTRAVENOUS at 03:59

## 2017-02-28 RX ADMIN — DOCUSATE SODIUM 100 MG: 100 CAPSULE, LIQUID FILLED ORAL at 17:33

## 2017-02-28 RX ADMIN — OXYCODONE HYDROCHLORIDE 10 MG: 5 TABLET ORAL at 04:19

## 2017-02-28 RX ADMIN — KETOROLAC TROMETHAMINE 10 MG: 10 TABLET, FILM COATED ORAL at 09:15

## 2017-02-28 RX ADMIN — DIPHENHYDRAMINE HYDROCHLORIDE 12.5 MG: 50 INJECTION, SOLUTION INTRAMUSCULAR; INTRAVENOUS at 04:02

## 2017-02-28 RX ADMIN — HYDROMORPHONE HYDROCHLORIDE 1 MG: 1 INJECTION, SOLUTION INTRAMUSCULAR; INTRAVENOUS; SUBCUTANEOUS at 02:01

## 2017-02-28 RX ADMIN — KETOROLAC TROMETHAMINE 10 MG: 10 TABLET, FILM COATED ORAL at 21:11

## 2017-02-28 RX ADMIN — SODIUM CHLORIDE, SODIUM LACTATE, POTASSIUM CHLORIDE, AND CALCIUM CHLORIDE 150 ML/HR: 600; 310; 30; 20 INJECTION, SOLUTION INTRAVENOUS at 01:32

## 2017-02-28 RX ADMIN — HYDROMORPHONE HYDROCHLORIDE 1 MG: 2 TABLET ORAL at 23:20

## 2017-02-28 RX ADMIN — MAGNESIUM HYDROXIDE 30 ML: 400 SUSPENSION ORAL at 14:23

## 2017-02-28 RX ADMIN — LORAZEPAM 0.5 MG: 1 TABLET ORAL at 00:56

## 2017-02-28 RX ADMIN — HYDROMORPHONE HYDROCHLORIDE 1 MG: 2 TABLET ORAL at 17:33

## 2017-02-28 RX ADMIN — ZOLPIDEM TARTRATE 5 MG: 5 TABLET ORAL at 23:20

## 2017-02-28 RX ADMIN — SIMETHICONE CHEW TAB 80 MG 80 MG: 80 TABLET ORAL at 17:33

## 2017-02-28 RX ADMIN — HYDROMORPHONE HYDROCHLORIDE 1 MG: 2 TABLET ORAL at 11:54

## 2017-02-28 RX ADMIN — FLUTICASONE PROPIONATE 1 SPRAY: 50 SPRAY, METERED NASAL at 11:55

## 2017-02-28 RX ADMIN — KETOROLAC TROMETHAMINE 10 MG: 10 TABLET, FILM COATED ORAL at 15:11

## 2017-02-28 RX ADMIN — AMLODIPINE BESYLATE 5 MG: 5 TABLET ORAL at 04:03

## 2017-02-28 RX ADMIN — DOCUSATE SODIUM 100 MG: 100 CAPSULE, LIQUID FILLED ORAL at 09:15

## 2017-02-28 RX ADMIN — HYDROMORPHONE HYDROCHLORIDE 1 MG: 1 INJECTION, SOLUTION INTRAMUSCULAR; INTRAVENOUS; SUBCUTANEOUS at 06:05

## 2017-02-28 RX ADMIN — LORAZEPAM 0.5 MG: 1 TABLET ORAL at 04:59

## 2017-02-28 RX ADMIN — OXYCODONE HYDROCHLORIDE 10 MG: 5 TABLET ORAL at 00:26

## 2017-02-28 NOTE — PROGRESS NOTES
Assessment completed as noted, plan of care discussed, encouraged use of IS  Encouraged ambulation in hallway  Discussed pain managment

## 2017-02-28 NOTE — PROGRESS NOTES
Spoke with Dr. Blase Goltz while on rounds, may give toradol, discussed pain control, states to not give ativan routinely since norvasc is ordered for BP

## 2017-02-28 NOTE — PROGRESS NOTES
Kurt'd for P-op BP elevation of recent onseet. hosp initiated Norvasc and will cont. @ d/c  Stable from surg standpt and cont observ for pain mgmt as well as BP control w/ expected d/c 3/1.   EPF

## 2017-02-28 NOTE — PROGRESS NOTES
Spoke with  regarding patient's high blood pressures. Order received for Ativan 0.5 mg PO. Repeat every 4 hours if B/P returns to normal. Order read back and verified.

## 2017-02-28 NOTE — PROGRESS NOTES
Bedside report received from [facility]. Care assumed. Denies needs at present.

## 2017-02-28 NOTE — PROGRESS NOTES
Spoke with Dr. Sherry Cano regarding pain, complaint of no gas since sugery, mylicon and milk of magnesia, ambulate in hallway

## 2017-02-28 NOTE — CONSULTS
HOSPITALIST H&P/CONSULT  NAME:  Zunilda Foreman   Age:  42 y.o.  :   1980   MRN:   [id number]  PCP: Ranjit Vuong MD  Consulting MD:  Treatment Team: Attending Provider: Karl Glez MD; Care Manager: Kelly Connor; Utilization Review: Tamir Hartman RN  HPI:   Marlena Cortez is a 41 yo B F admited 17 for [place] and bilateral salpingectomy for submucosal uterine leiomyoma. She is doing well post-op except for elevated blood pressure. She denies personal history of HTN in the past, but has a strong family history of HTN. She was told a few days ago when she came in for pre-op evaluation, her bp high then. It has been consistently >140/90 during this admission. She has never taken bp medication before. Complete ROS done and is as stated in HPI or otherwise negative  Past Medical History:   Diagnosis Date    Abnormal Pap smear of cervix     Anemia     Essential hypertension 2017    Fibroids     GERD (gastroesophageal reflux disease)     OTC med PRN-occasionally    Morbid obesity ([address]     BMI-34.2      Past Surgical History:   Procedure Laterality Date    HX COLPOSCOPY      HX CYST REMOVAL Left     under arm    HX MYOMECTOMY      HX WISDOM TEETH EXTRACTION        Prior to Admission Medications   Prescriptions Last Dose Informant Patient Reported? Taking? FA/OMEGA-3/DHA/EPA/ST.ISABELLE (MOOD SUPPORT FORMULA PO) 2017 at Unknown time  Yes Yes   Sig: Take  by mouth daily as needed. FERROUS SULFATE, DRIED (IRON, DRIED, PO) 2017 at Unknown time  Yes Yes   Sig: Take 3 Tabs by mouth daily. ascorbic acid, vitamin C, (VITAMIN C) 500 mg tablet 2017 at Unknown time  Yes Yes   Sig: Take  by mouth. fluticasone (FLONASE) 50 mcg/actuation nasal spray 2017 at Unknown time  Yes Yes   [place] by Both Nostrils route two (2) times a day. Take / use AM day of surgery  per anesthesia protocols.    norethindrone-ethinyl estradiol (ORTHO-NOVUM , ,) 1-35 mg-mcg per tablet 2017 at Unknown time  No Yes   Sig: Take 1 Tab by mouth every evening. Patient taking differently: Take 2 Tabs by mouth every evening. traMADol (ULTRAM) 50 mg tablet 2017 at Unknown time  No Yes   Sig: Take 1 Tab by mouth every six (6) hours as needed for Pain. Max Daily Amount: 200 mg.   zolpidem (AMBIEN) 10 mg tablet 2017  No No   Sig: Take 1 Tab by mouth nightly as needed. Max Daily Amount: 10 mg. Facility-Administered Medications: None     Allergies   Allergen Reactions    Latex Itching    Hydrocodone Nausea Only      Social History   Substance Use Topics    Smoking status: Never Smoker    Smokeless tobacco: Never Used    Alcohol use 1.8 - 2.4 oz/week     3 - 4 Glasses of wine per week      Family History   Problem Relation Age of Onset    Hypertension Mother    [employer] Elevated Lipids Mother     Hypertension Father     Diabetes Father     Heart Disease Father     Elevated Lipids Father     High Cholesterol Father     Breast Cancer Other       Objective:     Visit Vitals    BP (!) 163/114 (BP 1 Location: Right arm, BP Patient Position: Sitting;Post activity)    Pulse 94    Temp 98.9 °F (37.2 °C)    Resp 18    Ht 5' 3\" (1.6 m)    Wt 87.1 kg (192 lb)    SpO2 96%    BMI 34.01 kg/m2      Temp (24hrs), Av.5 °F (36.9 °C), Min:98 °F (36.7 °C), Max:98.9 °F (37.2 °C)    Oxygen Therapy  O2 Sat (%): 96 % (17 1422)  O2 Device: Room air (17 1422)  O2 Flow Rate (L/min): 1 l/min (17 1330)  Physical Exam:  General:    Alert, cooperative, no distress, appears stated age. Head:   Normocephalic, without obvious abnormality, atraumatic. Nose:  Nares normal. No drainage or sinus tenderness. Lungs:   Clear to auscultation bilaterally. No Wheezing or Rhonchi. No rales. Heart:   Regular rate and rhythm,  no murmur, rub or gallop. Abdomen:   Soft, non-tender. Not distended. Bowel sounds normal.   Extremities: No cyanosis. No edema.  No clubbing  Skin:     Texture, turgor normal. No rashes or lesions. Not Jaundiced  Neurologic: Alert and oriented x 3, no focal deficits   Data Review:   Recent Results (from the past 24 hour(s))   TYPE & SCREEN    Collection Time: 02/27/17  7:05 AM   Result Value Ref Range    Crossmatch Expiration 03/02/2017     ABO/Rh(D) Illa Christine POSITIVE     Antibody screen NEG    CREATININE    Collection Time: 02/27/17  7:05 AM   Result Value Ref Range    Creatinine 0.69 0.6 - 1.0 MG/DL   HCG URINE, QL. - POC    Collection Time: 02/27/17  7:21 AM   Result Value Ref Range    Pregnancy test,urine (POC) NEGATIVE  NEG       Imaging Indy Stuart /Studies     Assessment and Plan: Active Hospital Problems    Diagnosis Date Noted    Essential hypertension 02/28/2017    Fibroid 02/27/2017       PLAN  ·  Essential hypertension- stop IVF as she is eating and drinking well and this is likely elevating bp. Start amlodipine 5 mg daily. Recommend monitoring blood pressure several times a week at home and follow up with PCP for monitoring of blood pressure and medication adjustment. · Will sign off. Please contact us again if needed. Thanks. Signed By: Rocio Stout MD     February 28, 2017

## 2017-02-28 NOTE — OP NOTES
[address]   OPERATIVE REPORT       Name:  Rosalba Regalado   MR#:  [id number]   :  1980   Account #:  [de-identified]   Date of Adm:  2017       PREOPERATIVE DIAGNOSIS: Symptomatic large uterine fibroids with   menorrhagia, dysmenorrhea, and anemia. POSTOPERATIVE DIAGNOSIS: Symptomatic large uterine fibroids with   menorrhagia, dysmenorrhea, and anemia. PROCEDURE:    1. Total abdominal hysterectomy. 2. Bilateral salpingectomy. SURGEON: Berny Charles MD.    ANESTHESIA: General endotracheal.    ESTIMATED BLOOD LOSS: 300     SPECIMEN: Uterus and tubes. DRAINS: Intraoperative indwelling Moore. FINDINGS: 18 week size uterus with adhesions from previous   myomectomy. Normal ovaries. DESCRIPTION OF PROCEDURE: The patient was taken to OR 3 where   after general endotracheal anesthesia was established, she was   appropriately positioned, scrubbed, prepped and draped. Official   time-out procedure verified all pertinent information,   identified the patient, confirmed by those in attendance,   sterility indicators were noted. Pfannenstiel incision made through a previous existing incision   and carried down through the subcu with electrocautery. Fascia   encountered, opened with electrocautery, undermined inferiorly   and superiorly. Rectus muscles  sharply. Peritoneum   elevated and sharply, extended  under direct vision. The Aram   wound retractor was inserted and properly positioned, allowing   us to grasp the fundus of the uterus and elevate this out of the   pelvis. This did require use of the thyroid tenaculum which is   where the overwhelming majority of the bleeding came from   this grasped site the fundus of the uterus approximately 80% to   85% of the blood loss was from this initial area which was   required to elevate the uterus out of the incision.  Once this   was elevated with the Impact device, we started on the left,   carefully taking down the round ligament, the utero-ovarian   ligament with noted adhesions behind this where the ovary was   adhered to the posterior uterus. This required some element of   tedious dissection to ensure that this was taken down properly    and in a normal functional ovary and we had no other organs   involved with the adhesions. We were able to come down open up   that broad ligament, clamping the vessels on the left and then   coming down exact same thing on the right and clamping and   coagulating these vessels. This stemmed the flow of the bleeding   from the grasping site. Then we were able to take down the   bladder flap, coming back. While we were on the right, we   continued to take this down to the complete vascular pedicle   with coagulating and transecting these, reflecting them back out of   the operative field. We turned our attention back to the left and on this side, again   came back and completely reflected back the vascular bundle,   pushing the bladder flap well down below the cervix,    the left fallopian tube from the ovary and the ovary from the   posterior uterus. Once we had the vessels reflected down below   the cervical junction, we amputated the uterus from the cervix,   grasped the cervical stump with a long double-tooth tenaculum   for elevation and with curved R-N's carefully coming down and   removing the cervix, using Carrillo transfixing sutures and   anchoring the uterosacrals to the posterior upper vaginal wall. At this point, we closed the cuff with 2 layers running   interlocking, followed by a running suture. There were several   areas where the rectum had been pulled up posteriorly on the   uterus. The serosa on the posterior uterus was carefully teased   back to allow the rectal vault to drop back down into the pelvis   without any undue tension. We did not actually get into the   serosa of the rectum itself with doing the technique we used.      At this point with sponge and instrument count correct and after   copious irrigation, suction until clear, confirmation of   complete and absolute hemostasis, we terminated the procedure,   removing the 3 warm moist sterile lap squares, removing the   Aram retractor, closing the abdomen in 3 layers with a running   suture on the peritoneum, PDS across the fascia, subcuticular   tissue was thoroughly irrigated, suctioned and noted to be   completely hemostatic, prior to reapproximation with a running   Vicryl suture and then a subcuticular Monocryl for skin closure. She tolerated the procedure well and went to recovery room in   stable condition. Received postop B and O suppository along with   intraop IV acetaminophen and IV ketorolac.         Esther Peter MD      EPF / Geofm Query   D:  02/27/2017   10:37   T:  02/28/2017   07:46   Job #:  [id number]

## 2017-02-28 NOTE — PROGRESS NOTES
Spoke with Dr. Jaurez Barrgia regarding pain control per request of patient, discontinue oxycodone and IV toradol, may leave IV access out, toradol 10 mg po every 6 hours prn, dilaudid 1 mg po every 6 hours prn, read back

## 2017-03-01 VITALS
RESPIRATION RATE: 18 BRPM | TEMPERATURE: 98.4 F | HEIGHT: 63 IN | SYSTOLIC BLOOD PRESSURE: 144 MMHG | HEART RATE: 78 BPM | OXYGEN SATURATION: 96 % | WEIGHT: 192 LBS | DIASTOLIC BLOOD PRESSURE: 93 MMHG | BODY MASS INDEX: 34.02 KG/M2

## 2017-03-01 PROCEDURE — 74011250637 HC RX REV CODE- 250/637: Performed by: OBSTETRICS & GYNECOLOGY

## 2017-03-01 PROCEDURE — 74011250637 HC RX REV CODE- 250/637: Performed by: INTERNAL MEDICINE

## 2017-03-01 RX ORDER — KETOROLAC TROMETHAMINE 10 MG/1
10 TABLET, FILM COATED ORAL EVERY 6 HOURS
Qty: 20 TAB | Refills: 0 | Status: SHIPPED | OUTPATIENT
Start: 2017-03-01 | End: 2018-06-07

## 2017-03-01 RX ORDER — HYDROMORPHONE HYDROCHLORIDE 2 MG/1
1 TABLET ORAL
Qty: 24 TAB | Refills: 0 | Status: SHIPPED | OUTPATIENT
Start: 2017-03-01 | End: 2017-04-10 | Stop reason: ALTCHOICE

## 2017-03-01 RX ORDER — AMLODIPINE BESYLATE 5 MG/1
5 TABLET ORAL DAILY
Qty: 30 TAB | Refills: 5 | Status: SHIPPED | OUTPATIENT
Start: 2017-03-01 | End: 2018-06-07

## 2017-03-01 RX ADMIN — KETOROLAC TROMETHAMINE 10 MG: 10 TABLET, FILM COATED ORAL at 03:25

## 2017-03-01 RX ADMIN — KETOROLAC TROMETHAMINE 10 MG: 10 TABLET, FILM COATED ORAL at 09:09

## 2017-03-01 RX ADMIN — HYDROMORPHONE HYDROCHLORIDE 1 MG: 2 TABLET ORAL at 10:54

## 2017-03-01 RX ADMIN — LORAZEPAM 0.5 MG: 1 TABLET ORAL at 09:09

## 2017-03-01 RX ADMIN — FLUTICASONE PROPIONATE 1 SPRAY: 50 SPRAY, METERED NASAL at 08:05

## 2017-03-01 RX ADMIN — DOCUSATE SODIUM 100 MG: 100 CAPSULE, LIQUID FILLED ORAL at 08:04

## 2017-03-01 RX ADMIN — HYDROMORPHONE HYDROCHLORIDE 1 MG: 2 TABLET ORAL at 05:20

## 2017-03-01 RX ADMIN — AMLODIPINE BESYLATE 5 MG: 5 TABLET ORAL at 08:04

## 2017-03-01 RX ADMIN — SIMETHICONE CHEW TAB 80 MG 80 MG: 80 TABLET ORAL at 08:04

## 2017-03-01 NOTE — PROGRESS NOTES
Patient discharged to home per MD order. Patient taken via wheelchair to private vehicle, stable at discharge.

## 2017-03-01 NOTE — PROGRESS NOTES
Patient assessment completed as noted. Patient medicated with Novasc, colace and Flonase as scheduled. Mylicon given per pt request for gas.

## 2017-03-01 NOTE — PROGRESS NOTES
Discharge teaching completed, questions encouraged. Prescriptions given. Instructed patient to call out for assistance once her ride is here and she is ready to leave unit, patient verbalized understanding.

## 2017-03-01 NOTE — DISCHARGE INSTRUCTIONS
High Blood Pressure: Care Instructions  Your Care Instructions  If your blood pressure is usually above 140/90, you have high blood pressure, or hypertension. That means the top number is 140 or higher or the bottom number is 90 or higher, or both. Despite what a lot of people think, high blood pressure usually doesn't cause headaches or make you feel dizzy or lightheaded. It usually has no symptoms. But it does increase your risk for heart attack, stroke, and kidney or eye damage. The higher your blood pressure, the more your risk increases. Your doctor will give you a goal for your blood pressure. Your goal will be based on your health and your age. An example of a goal is to keep your blood pressure below 140/90. Lifestyle changes, such as eating healthy and being active, are always important to help lower blood pressure. You might also take medicine to reach your blood pressure goal.  Follow-up care is a key part of your treatment and safety. Be sure to make and go to all appointments, and call your doctor if you are having problems. It's also a good idea to know your test results and keep a list of the medicines you take. How can you care for yourself at home? Medical treatment  · If you stop taking your medicine, your blood pressure will go back up. You may take one or more types of medicine to lower your blood pressure. Be safe with medicines. Take your medicine exactly as prescribed. Call your doctor if you think you are having a problem with your medicine. · Talk to your doctor before you start taking aspirin every day. Aspirin can help certain people lower their risk of a heart attack or stroke. But taking aspirin isn't right for everyone, because it can cause serious bleeding. · See your doctor regularly. You may need to see the doctor more often at first or until your blood pressure comes down.   · If you are taking blood pressure medicine, talk to your doctor before you take decongestants or anti-inflammatory medicine, such as ibuprofen. Some of these medicines can raise blood pressure. · Learn how to check your blood pressure at home. Lifestyle changes  · Stay at a healthy weight. This is especially important if you put on weight around the waist. Losing even 10 pounds can help you lower your blood pressure. · If your doctor recommends it, get more exercise. Walking is a good choice. Bit by bit, increase the amount you walk every day. Try for at least 30 minutes on most days of the week. You also may want to swim, bike, or do other activities. · Avoid or limit alcohol. Talk to your doctor about whether you can drink any alcohol. · Try to limit how much sodium you eat to less than 2,300 milligrams (mg) a day. Your doctor may ask you to try to eat less than 1,500 mg a day. · Eat plenty of fruits (such as bananas and oranges), vegetables, legumes, whole grains, and low-fat dairy products. · Lower the amount of saturated fat in your diet. Saturated fat is found in animal products such as milk, cheese, and meat. Limiting these foods may help you lose weight and also lower your risk for heart disease. · Do not smoke. Smoking increases your risk for heart attack and stroke. If you need help quitting, talk to your doctor about stop-smoking programs and medicines. These can increase your chances of quitting for good. When should you call for help? Call 911 anytime you think you may need emergency care. This may mean having symptoms that suggest that your blood pressure is causing a serious heart or blood vessel problem. Your blood pressure may be over 180/110. For example, call 911 if:  · You have symptoms of a heart attack. These may include:  ¨ Chest pain or pressure, or a strange feeling in the chest.  ¨ Sweating. ¨ Shortness of breath. ¨ Nausea or vomiting. ¨ Pain, pressure, or a strange feeling in the back, neck, jaw, or upper belly or in one or both shoulders or arms.   ¨ Lightheadedness or sudden weakness. ¨ A fast or irregular heartbeat. · You have symptoms of a stroke. These may include:  ¨ Sudden numbness, tingling, weakness, or loss of movement in your face, arm, or leg, especially on only one side of your body. ¨ Sudden vision changes. ¨ Sudden trouble speaking. ¨ Sudden confusion or trouble understanding simple statements. ¨ Sudden problems with walking or balance. ¨ A sudden, severe headache that is different from past headaches. · You have severe back or belly pain. Do not wait until your blood pressure comes down on its own. Get help right away. Call your doctor now or seek immediate care if:  · Your blood pressure is much higher than normal (such as 180/110 or higher), but you don't have symptoms. · You think high blood pressure is causing symptoms, such as:  ¨ Severe headache. ¨ Blurry vision. Watch closely for changes in your health, and be sure to contact your doctor if:  · Your blood pressure measures 140/90 or higher at least 2 times. That means the top number is 140 or higher or the bottom number is 90 or higher, or both. · You think you may be having side effects from your blood pressure medicine. · Your blood pressure is usually normal, but it goes above normal at least 2 times. Where can you learn more? Go to http://nydia-florian.info/. Enter J263 in the search box to learn more about \"High Blood Pressure: Care Instructions. \"  Current as of: August 8, 2016  Content Version: 11.1  © 9236-8748 [employer]. Care instructions adapted under license by [employer] (which disclaims liability or warranty for this information). If you have questions about a medical condition or this instruction, always ask your healthcare professional. [place] [address] any warranty or liability for your use of this information.      Abdominal Hysterectomy: What to Expect at [address] [place] [address] can expect to feel better and stronger each day, although you may need pain medicine for a week or two. You may get tired easily or have less energy than usual. This may last for several weeks after surgery. You will probably notice that your belly is swollen and puffy. This is common. The swelling will take several weeks to go down. It may take about 4 to 6 weeks to fully recover. It is important to avoid lifting while you are recovering so that you can heal.  This care sheet gives you a general idea about how long it will take for you to recover. But each person recovers at a different pace. Follow the steps below to get better as quickly as possible. How can you care for yourself at home? Activity  · Rest when you feel tired. Getting enough sleep will help you recover. · Try to walk each day. Start by walking a little more than you did the day before. Bit by bit, increase the amount you walk. Walking boosts blood flow and helps prevent pneumonia and constipation. · Avoid lifting anything that would make you strain. This may include a child, heavy grocery bags and milk containers, a heavy briefcase or backpack, cat litter or dog food bags, or a vacuum . · Avoid strenuous activities, such as biking, jogging, weight lifting, or aerobic exercise, until your doctor says it is okay. · You may shower. Pat the cut (incision) dry. Do not take a bath for the first 2 weeks, or until your doctor tells you it is okay. · Ask your doctor when you can drive again. · You will probably need to take 2 to 4 weeks off from work. It depends on the type of work you do and how you feel. · Your doctor will tell you when you can have sex again. Diet  · You can eat your normal diet. If your stomach is upset, try bland, low-fat foods like plain rice, broiled chicken, toast, and yogurt. · Drink plenty of fluids (unless your doctor tells you not to). · You may notice that your bowel movements are not regular right after your surgery. This is common. Try to avoid constipation and straining with bowel movements. You may want to take a fiber supplement every day. If you have not had a bowel movement after a couple of days, ask your doctor about taking a mild laxative. Medicines  · Your doctor will tell you if and when you can restart your medicines. He or she will also give you instructions about taking any new medicines. · If you take blood thinners, such as warfarin (Coumadin), clopidogrel (Plavix), or aspirin, be sure to talk to your doctor. He or she will tell you if and when to start taking those medicines again. Make sure that you understand exactly what your doctor wants you to do. · Be safe with medicines. Take pain medicines exactly as directed. ¨ If the doctor gave you a prescription medicine for pain, take it as prescribed. ¨ If you are not taking a prescription pain medicine, ask your doctor if you can take an over-the-counter medicine. · If your doctor prescribed antibiotics, take them as directed. Do not stop taking them just because you feel better. You need to take the full course of antibiotics. · If you think your pain medicine is making you sick to your stomach:  ¨ Take your medicine after meals (unless your doctor has told you not to). ¨ Ask your doctor for a different pain medicine. Incision care  · If you have strips of tape on the cut (incision) the doctor made, leave the tape on for a week or until it falls off. Or follow your doctor's instructions for removing the tape. · Wash the area daily with warm, soapy water, and pat it dry. Don't use hydrogen peroxide or alcohol, which can slow healing. You may cover the area with a gauze bandage if it weeps or rubs against clothing. Change the bandage every day. · Keep the area clean and dry. Other instructions  · You may have some light vaginal bleeding. Wear sanitary pads if needed. Do not douche or use tampons. Follow-up care is a key part of your treatment and safety.  Be sure to make and go to all appointments, and call your doctor if you are having problems. It's also a good idea to know your test results and keep a list of the medicines you take. When should you call for help? Call 911 anytime you think you may need emergency care. For example, call if:  · You passed out (lost consciousness). · You have sudden chest pain and shortness of breath, or you cough up blood. · You have severe pain in your belly. Call your doctor now or seek immediate medical care if:  · You have bright red vaginal bleeding that soaks one or more pads in an hour, or you have large clots. · You have foul-smelling discharge from your vagina. · You are sick to your stomach or cannot keep fluids down. · You have signs of infection, such as:  ¨ Increased pain, swelling, warmth, or redness. ¨ Red streaks leading from the incision. ¨ Pus draining from the incision. ¨ A fever. · You have pain that does not get better after you take pain medicine. · You have loose stitches, or your incision comes open. · You have signs of a blood clot, such as:  ¨ Pain in your calf, back of knee, thigh, or groin. ¨ Redness and swelling in your leg or groin. · You have trouble passing urine or stool, especially if you have pain or swelling in your lower belly. · You have hot flashes, sweating, flushing, or a fast or pounding heartbeat. Watch closely for changes in your health, and be sure to contact your doctor if:  · You do not have a bowel movement after taking a laxative. Where can you learn more? Go to http://nydia-florian.info/. Enter M280 in the search box to learn more about \"Abdominal Hysterectomy: What to Expect at Home. \"  Current as of: February 25, 2016  Content Version: 11.1  © 8525-5098 [employer]. Care instructions adapted under license by [employer] (which disclaims liability or warranty for this information).  If you have questions about a medical condition or this instruction, always ask your healthcare professional. [place] [address] any warranty or liability for your use of this information.

## 2017-03-01 NOTE — PROGRESS NOTES
Report to  regarding patient's request for suppository. Order received for Dulcolax suppository. Read back and verified.

## 2017-03-01 NOTE — DISCHARGE SUMMARY
Overall doing well .still w/ mild htn as noted. PE:wnl  Stable and doing well from post-op standpy. D/C to homew/ f/u-->6 wks w/ me form psot-op and 1*MD for f/u on BP issues.   EPF

## 2017-03-01 NOTE — PROGRESS NOTES
Report of care received from, Diego Atkinson RN. Bedside report given, pt denies further needs at present time.

## 2017-07-15 ENCOUNTER — HOSPITAL ENCOUNTER (OUTPATIENT)
Dept: MAMMOGRAPHY | Age: 37
Discharge: HOME | End: 2017-07-15
Attending: OBSTETRICS & GYNECOLOGY
Payer: COMMERCIAL

## 2017-07-15 DIAGNOSIS — Z12.39 SCREENING FOR MALIGNANT NEOPLASM OF BREAST: ICD-10-CM

## 2017-07-15 PROCEDURE — 77067 SCR MAMMO BI INCL CAD: CPT

## 2017-07-27 ENCOUNTER — HOSPITAL ENCOUNTER (OUTPATIENT)
Dept: MAMMOGRAPHY | Age: 37
Discharge: HOME | End: 2017-07-27
Attending: OBSTETRICS & GYNECOLOGY
Payer: COMMERCIAL

## 2017-07-27 DIAGNOSIS — R92.8 ABNORMAL SCREENING MAMMOGRAM: ICD-10-CM

## 2017-07-27 PROCEDURE — 77065 DX MAMMO INCL CAD UNI: CPT

## 2018-06-07 PROBLEM — J30.1 SEASONAL ALLERGIC RHINITIS DUE TO POLLEN: Status: ACTIVE | Noted: 2018-04-04

## 2018-06-07 PROBLEM — L73.2 HIDRADENITIS: Status: ACTIVE | Noted: 2018-04-04

## 2018-06-07 PROBLEM — G47.00 INSOMNIA: Status: ACTIVE | Noted: 2018-04-04

## 2018-09-05 ENCOUNTER — HOSPITAL ENCOUNTER (OUTPATIENT)
Dept: MAMMOGRAPHY | Age: 38
Discharge: HOME | End: 2018-09-05
Attending: FAMILY MEDICINE
Payer: COMMERCIAL

## 2018-09-05 DIAGNOSIS — Z12.31 VISIT FOR SCREENING MAMMOGRAM: ICD-10-CM

## 2018-09-05 PROCEDURE — 77067 SCR MAMMO BI INCL CAD: CPT

## 2018-11-20 PROBLEM — L73.2 HIDRADENITIS AXILLARIS: Status: ACTIVE | Noted: 2018-10-18

## 2019-05-16 PROBLEM — E66.01 SEVERE OBESITY (HCC): Status: ACTIVE | Noted: 2019-05-16

## 2019-09-10 ENCOUNTER — HOSPITAL ENCOUNTER (OUTPATIENT)
Dept: MAMMOGRAPHY | Age: 39
Discharge: HOME | End: 2019-09-10
Attending: FAMILY MEDICINE

## 2019-09-10 DIAGNOSIS — Z12.39 SCREENING BREAST EXAMINATION: ICD-10-CM

## 2019-09-11 ENCOUNTER — HOSPITAL ENCOUNTER (OUTPATIENT)
Dept: MAMMOGRAPHY | Age: 39
Discharge: HOME | End: 2019-09-11
Attending: FAMILY MEDICINE
Payer: COMMERCIAL

## 2019-09-11 PROCEDURE — 77067 SCR MAMMO BI INCL CAD: CPT

## 2021-01-08 ENCOUNTER — TRANSCRIBE ORDER (OUTPATIENT)
Dept: REGISTRATION | Age: 41
End: 2021-01-08

## 2021-01-08 ENCOUNTER — TRANSCRIBE ORDER (OUTPATIENT)
Dept: SCHEDULING | Age: 41
End: 2021-01-08

## 2021-01-08 DIAGNOSIS — Z12.31 ENCOUNTER FOR SCREENING MAMMOGRAM FOR MALIGNANT NEOPLASM OF BREAST: Primary | ICD-10-CM

## 2021-02-05 ENCOUNTER — HOSPITAL ENCOUNTER (OUTPATIENT)
Dept: MAMMOGRAPHY | Age: 41
Discharge: HOME | End: 2021-02-05
Attending: FAMILY MEDICINE
Payer: COMMERCIAL

## 2021-02-05 DIAGNOSIS — Z12.31 ENCOUNTER FOR SCREENING MAMMOGRAM FOR MALIGNANT NEOPLASM OF BREAST: ICD-10-CM

## 2021-02-05 PROCEDURE — 77063 BREAST TOMOSYNTHESIS BI: CPT

## 2021-11-10 ENCOUNTER — TRANSCRIBE ORDER (OUTPATIENT)
Dept: SCHEDULING | Age: 41
End: 2021-11-10

## 2021-11-10 DIAGNOSIS — Z12.31 ENCOUNTER FOR SCREENING MAMMOGRAM FOR MALIGNANT NEOPLASM OF BREAST: Primary | ICD-10-CM

## 2022-01-14 ENCOUNTER — TRANSCRIBE ORDER (OUTPATIENT)
Dept: SCHEDULING | Age: 42
End: 2022-01-14

## 2022-01-14 DIAGNOSIS — Z12.31 SCREENING MAMMOGRAM, ENCOUNTER FOR: Primary | ICD-10-CM

## 2022-02-21 ENCOUNTER — HOSPITAL ENCOUNTER (OUTPATIENT)
Dept: MAMMOGRAPHY | Age: 42
Discharge: HOME | End: 2022-02-21
Attending: FAMILY MEDICINE
Payer: COMMERCIAL

## 2022-02-21 DIAGNOSIS — Z12.31 SCREENING MAMMOGRAM, ENCOUNTER FOR: ICD-10-CM

## 2022-02-21 PROCEDURE — 77063 BREAST TOMOSYNTHESIS BI: CPT

## 2022-03-18 PROBLEM — D21.9 FIBROID: Status: ACTIVE | Noted: 2017-02-27

## 2022-03-19 PROBLEM — E66.01 SEVERE OBESITY (HCC): Status: ACTIVE | Noted: 2019-05-16

## 2022-03-19 PROBLEM — G47.00 INSOMNIA: Status: ACTIVE | Noted: 2018-04-04

## 2022-03-19 PROBLEM — I10 ESSENTIAL HYPERTENSION: Status: ACTIVE | Noted: 2017-02-28

## 2022-03-19 PROBLEM — L73.2 HIDRADENITIS AXILLARIS: Status: ACTIVE | Noted: 2018-10-18

## 2022-03-19 PROBLEM — J30.1 SEASONAL ALLERGIC RHINITIS DUE TO POLLEN: Status: ACTIVE | Noted: 2018-04-04

## 2023-03-30 ENCOUNTER — HOSPITAL ENCOUNTER (OUTPATIENT)
Dept: MAMMOGRAPHY | Age: 43
Discharge: HOME | End: 2023-03-30
Payer: COMMERCIAL

## 2023-03-30 DIAGNOSIS — Z12.31 SCREENING MAMMOGRAM FOR BREAST CANCER: ICD-10-CM

## 2023-03-30 PROCEDURE — 77063 BREAST TOMOSYNTHESIS BI: CPT

## 2024-11-30 ENCOUNTER — HOSPITAL ENCOUNTER (OUTPATIENT)
Dept: MAMMOGRAPHY | Age: 44
Discharge: HOME | End: 2024-12-03
Payer: COMMERCIAL

## 2024-11-30 PROCEDURE — 77063 BREAST TOMOSYNTHESIS BI: CPT

## 2025-06-16 ENCOUNTER — OFFICE VISIT (OUTPATIENT)
Age: 45
End: 2025-06-16

## 2025-06-16 VITALS
RESPIRATION RATE: 14 BRPM | OXYGEN SATURATION: 99 % | HEART RATE: 91 BPM | SYSTOLIC BLOOD PRESSURE: 118 MMHG | DIASTOLIC BLOOD PRESSURE: 82 MMHG

## 2025-06-16 DIAGNOSIS — Z01.30 BLOOD PRESSURE CHECK: Primary | ICD-10-CM

## 2025-06-16 PROBLEM — Z90.711 HX OF ABDOMINAL SUPRACERVICAL SUBTOTAL HYSTERECTOMY: Status: ACTIVE | Noted: 2023-04-24

## 2025-06-16 PROBLEM — L73.2 HIDRADENITIS: Status: ACTIVE | Noted: 2018-04-04

## 2025-06-16 PROBLEM — I10 HYPERTENSION, BENIGN: Status: ACTIVE | Noted: 2017-02-28

## 2025-06-16 PROBLEM — M25.532 LEFT WRIST PAIN: Status: ACTIVE | Noted: 2018-04-04

## 2025-06-16 RX ORDER — LOSARTAN POTASSIUM AND HYDROCHLOROTHIAZIDE 12.5; 5 MG/1; MG/1
1 TABLET ORAL DAILY
COMMUNITY
Start: 2025-02-12

## 2025-06-16 RX ORDER — MULTIVITAMIN
1 TABLET ORAL DAILY
COMMUNITY

## 2025-06-16 RX ORDER — ERGOCALCIFEROL 1.25 MG/1
CAPSULE, LIQUID FILLED ORAL
COMMUNITY
Start: 2025-05-04

## 2025-06-16 ASSESSMENT — ENCOUNTER SYMPTOMS
SHORTNESS OF BREATH: 0
PHOTOPHOBIA: 0
VOMITING: 0
NAUSEA: 0

## 2025-06-16 NOTE — PROGRESS NOTES
PROGRESS NOTE    SUBJECTIVE:   Phong Walker is a 45 y.o. female seen in [facility] at [facility] for blood pressure check as she had a brief bout of \"llightheadedness\" after standing up to go to the restroom earlier this morning. Reports this has since resolved but wanted to ensure her blood pressure was okay. Typically drinks coffee and tea throughout day with some water.      BLOOD PRESSURE CHECK      Current Outpatient Medications   Medication Sig Dispense Refill    vitamin D (ERGOCALCIFEROL) 1.25 MG (04475 UT) CAPS capsule TAKE ONE CAPSULE BY MOUTH EVERY WEEK      losartan-hydroCHLOROthiazide (HYZAAR) 50-12.5 MG per tablet Take 1 tablet by mouth daily      Multiple Vitamin (DAILY VITES) TABS Take 1 tablet by mouth daily      cetirizine (ZYRTEC) 10 MG tablet Take 10 mg by mouth daily as needed      fluticasone (FLONASE) 50 MCG/ACT nasal spray 1 spray by Nasal route 2 times daily      zolpidem (AMBIEN) 10 MG tablet Take 10 mg by mouth.       No current facility-administered medications for this visit.      Allergies   Allergen Reactions    Latex Itching    Hydrocodone Nausea Only     restless       Social History     Tobacco Use    Smoking status: Never    Smokeless tobacco: Never   Substance Use Topics    Alcohol use: Yes     Alcohol/week: 3.0 - 4.0 standard drinks of alcohol    Drug use: No        Review of Systems   Constitutional:  Negative for diaphoresis and fatigue.   Eyes:  Negative for photophobia and visual disturbance.   Respiratory:  Negative for shortness of breath.    Cardiovascular:  Negative for chest pain and palpitations.   Gastrointestinal:  Negative for nausea and vomiting.   Musculoskeletal:  Negative for gait problem and neck pain.   Skin:  Negative for pallor.   Neurological:  Positive for light-headedness. Negative for dizziness, syncope, facial asymmetry, speech difficulty, weakness, numbness and headaches.

## (undated) DEVICE — SUTURE PDS II SZ 0 L36IN ABSRB VLT L36MM CT-1 1/2 CIR Z346H

## (undated) DEVICE — SPONGE LAP 18X18IN STRL -- 5/PK

## (undated) DEVICE — TRAY PREP DRY W/ PREM GLV 2 APPL 6 SPNG 2 UNDPD 1 OVERWRAP

## (undated) DEVICE — SEALER ENDOSCP NANO COAT OPN DIV CRV L JAW LIGASURE IMPACT

## (undated) DEVICE — SUTURE VCRL SZ 3-0 L36IN ABSRB UD L36MM CT-1 1/2 CIR J944H

## (undated) DEVICE — 2000CC GUARDIAN II: Brand: GUARDIAN

## (undated) DEVICE — KENDALL SCD EXPRESS SLEEVES, KNEE LENGTH, MEDIUM: Brand: KENDALL SCD

## (undated) DEVICE — AGENT HEMSTAT 5GM ARISTA AH

## (undated) DEVICE — BLADE ELECTRODE: Brand: VALLEYLAB

## (undated) DEVICE — PERI-PAD,MODERATE: Brand: CURITY

## (undated) DEVICE — DRAPE TWL SURG 16X26IN BLU ORB04] ALLCARE INC]

## (undated) DEVICE — CATHETER URETH 16FR BLLN 5CC L16IN SIL F INDWL 2 W SHT LEN

## (undated) DEVICE — SUTURE VCRL SZ 2-0 L36IN ABSRB VLT L36MM CT-1 1/2 CIR J345H

## (undated) DEVICE — SUTURE ABSORBABLE BRAIDED 0 CT-1 8X27 IN UD VICRYL JJ41G

## (undated) DEVICE — CARDINAL HEALTH FLEXIBLE LIGHT HANDLE COVER: Brand: CARDINAL HEALTH

## (undated) DEVICE — CURVED, LARGE JAW, OPEN SEALER/DIVIDER NANO-COATED: Brand: LIGASURE IMPACT

## (undated) DEVICE — WOUND RETRACTOR AND PROTECTOR: Brand: ALEXIS O WOUND PROTECTOR-RETRACTOR

## (undated) DEVICE — SURGICAL PROCEDURE PACK BASIC ST FRANCIS

## (undated) DEVICE — CONTAINER SPEC HISTOLOGY 900ML POLYPR

## (undated) DEVICE — SUTURE MCRYL SZ 4-0 L27IN ABSRB UD L19MM PS-2 1/2 CIR PRIM Y426H

## (undated) DEVICE — TRAY CATH 16F DRN BG LTX -- CONVERT TO ITEM 363158

## (undated) DEVICE — REM POLYHESIVE ADULT PATIENT RETURN ELECTRODE: Brand: VALLEYLAB

## (undated) DEVICE — SOLUTION IV 1000ML 0.9% SOD CHL

## (undated) DEVICE — DRAPE,T,LAPARO,TRANS,STERILE: Brand: MEDLINE